# Patient Record
Sex: MALE | Race: WHITE | Employment: OTHER | URBAN - METROPOLITAN AREA
[De-identification: names, ages, dates, MRNs, and addresses within clinical notes are randomized per-mention and may not be internally consistent; named-entity substitution may affect disease eponyms.]

---

## 2020-02-18 ENCOUNTER — APPOINTMENT (OUTPATIENT)
Dept: CT IMAGING | Age: 45
End: 2020-02-18
Attending: EMERGENCY MEDICINE
Payer: COMMERCIAL

## 2020-02-18 ENCOUNTER — HOSPITAL ENCOUNTER (EMERGENCY)
Age: 45
Discharge: HOME | End: 2020-02-18
Attending: EMERGENCY MEDICINE
Payer: COMMERCIAL

## 2020-02-18 VITALS
RESPIRATION RATE: 18 BRPM | DIASTOLIC BLOOD PRESSURE: 95 MMHG | HEART RATE: 90 BPM | BODY MASS INDEX: 33.08 KG/M2 | HEIGHT: 68 IN | OXYGEN SATURATION: 94 % | TEMPERATURE: 97.7 F | SYSTOLIC BLOOD PRESSURE: 142 MMHG | WEIGHT: 218.26 LBS

## 2020-02-18 DIAGNOSIS — N20.0 BILATERAL RENAL STONES: Primary | ICD-10-CM

## 2020-02-18 LAB
ALBUMIN SERPL-MCNC: 4.2 G/DL (ref 3.5–5)
ALBUMIN/GLOB SERPL: 1.2 {RATIO} (ref 1.1–2.2)
ALP SERPL-CCNC: 77 U/L (ref 45–117)
ALT SERPL-CCNC: 36 U/L (ref 12–78)
ANION GAP SERPL CALC-SCNC: 18 MMOL/L (ref 5–15)
APPEARANCE UR: CLEAR
AST SERPL-CCNC: 14 U/L (ref 15–37)
BACTERIA URNS QL MICRO: NEGATIVE /HPF
BASOPHILS # BLD: 0 K/UL (ref 0–0.1)
BASOPHILS NFR BLD: 1 % (ref 0–1)
BILIRUB SERPL-MCNC: 0.4 MG/DL (ref 0.2–1)
BILIRUB UR QL: NEGATIVE
BUN SERPL-MCNC: 19 MG/DL (ref 6–20)
BUN/CREAT SERPL: 15 (ref 12–20)
CALCIUM SERPL-MCNC: 8.7 MG/DL (ref 8.5–10.1)
CHLORIDE SERPL-SCNC: 104 MMOL/L (ref 97–108)
CO2 SERPL-SCNC: 22 MMOL/L (ref 21–32)
COLOR UR: NORMAL
CREAT SERPL-MCNC: 1.27 MG/DL (ref 0.7–1.3)
DIFFERENTIAL METHOD BLD: NORMAL
EOSINOPHIL # BLD: 0.1 K/UL (ref 0–0.4)
EOSINOPHIL NFR BLD: 2 % (ref 0–7)
EPITH CASTS URNS QL MICRO: NORMAL /LPF
ERYTHROCYTE [DISTWIDTH] IN BLOOD BY AUTOMATED COUNT: 12.8 % (ref 11.5–14.5)
GLOBULIN SER CALC-MCNC: 3.4 G/DL (ref 2–4)
GLUCOSE SERPL-MCNC: 124 MG/DL (ref 65–100)
GLUCOSE UR STRIP.AUTO-MCNC: NEGATIVE MG/DL
HCT VFR BLD AUTO: 45.2 % (ref 36.6–50.3)
HGB BLD-MCNC: 15.4 G/DL (ref 12.1–17)
HGB UR QL STRIP: NEGATIVE
IMM GRANULOCYTES # BLD AUTO: 0 K/UL (ref 0–0.04)
IMM GRANULOCYTES NFR BLD AUTO: 0 % (ref 0–0.5)
KETONES UR QL STRIP.AUTO: NEGATIVE MG/DL
LEUKOCYTE ESTERASE UR QL STRIP.AUTO: NEGATIVE
LIPASE SERPL-CCNC: 248 U/L (ref 73–393)
LYMPHOCYTES # BLD: 2.6 K/UL (ref 0.8–3.5)
LYMPHOCYTES NFR BLD: 33 % (ref 12–49)
MCH RBC QN AUTO: 29.2 PG (ref 26–34)
MCHC RBC AUTO-ENTMCNC: 34.1 G/DL (ref 30–36.5)
MCV RBC AUTO: 85.6 FL (ref 80–99)
MONOCYTES # BLD: 0.6 K/UL (ref 0–1)
MONOCYTES NFR BLD: 8 % (ref 5–13)
NEUTS SEG # BLD: 4.5 K/UL (ref 1.8–8)
NEUTS SEG NFR BLD: 56 % (ref 32–75)
NITRITE UR QL STRIP.AUTO: NEGATIVE
NRBC # BLD: 0 K/UL (ref 0–0.01)
NRBC BLD-RTO: 0 PER 100 WBC
PH UR STRIP: 5.5 [PH] (ref 5–8)
PLATELET # BLD AUTO: 232 K/UL (ref 150–400)
PMV BLD AUTO: 11 FL (ref 8.9–12.9)
POTASSIUM SERPL-SCNC: 3.6 MMOL/L (ref 3.5–5.1)
PROT SERPL-MCNC: 7.6 G/DL (ref 6.4–8.2)
PROT UR STRIP-MCNC: NEGATIVE MG/DL
RBC # BLD AUTO: 5.28 M/UL (ref 4.1–5.7)
RBC #/AREA URNS HPF: NORMAL /HPF (ref 0–5)
SODIUM SERPL-SCNC: 144 MMOL/L (ref 136–145)
SP GR UR REFRACTOMETRY: 1.02 (ref 1–1.03)
UA: UC IF INDICATED,UAUC: NORMAL
UROBILINOGEN UR QL STRIP.AUTO: 0.2 EU/DL (ref 0.2–1)
WBC # BLD AUTO: 7.9 K/UL (ref 4.1–11.1)
WBC URNS QL MICRO: NORMAL /HPF (ref 0–4)

## 2020-02-18 PROCEDURE — 96374 THER/PROPH/DIAG INJ IV PUSH: CPT

## 2020-02-18 PROCEDURE — 74176 CT ABD & PELVIS W/O CONTRAST: CPT

## 2020-02-18 PROCEDURE — 74011250636 HC RX REV CODE- 250/636: Performed by: EMERGENCY MEDICINE

## 2020-02-18 PROCEDURE — 83690 ASSAY OF LIPASE: CPT

## 2020-02-18 PROCEDURE — 99282 EMERGENCY DEPT VISIT SF MDM: CPT

## 2020-02-18 PROCEDURE — 36415 COLL VENOUS BLD VENIPUNCTURE: CPT

## 2020-02-18 PROCEDURE — 96375 TX/PRO/DX INJ NEW DRUG ADDON: CPT

## 2020-02-18 PROCEDURE — 85025 COMPLETE CBC W/AUTO DIFF WBC: CPT

## 2020-02-18 PROCEDURE — 80053 COMPREHEN METABOLIC PANEL: CPT

## 2020-02-18 PROCEDURE — 81001 URINALYSIS AUTO W/SCOPE: CPT

## 2020-02-18 RX ORDER — KETOROLAC TROMETHAMINE 30 MG/ML
30 INJECTION, SOLUTION INTRAMUSCULAR; INTRAVENOUS
Status: COMPLETED | OUTPATIENT
Start: 2020-02-18 | End: 2020-02-18

## 2020-02-18 RX ORDER — ONDANSETRON 2 MG/ML
8 INJECTION INTRAMUSCULAR; INTRAVENOUS
Status: COMPLETED | OUTPATIENT
Start: 2020-02-18 | End: 2020-02-18

## 2020-02-18 RX ORDER — ONDANSETRON 8 MG/1
8 TABLET, ORALLY DISINTEGRATING ORAL
Qty: 15 TAB | Refills: 0 | Status: SHIPPED | OUTPATIENT
Start: 2020-02-18

## 2020-02-18 RX ORDER — NAPROXEN 500 MG/1
500 TABLET ORAL 2 TIMES DAILY WITH MEALS
Qty: 20 TAB | Refills: 0 | Status: SHIPPED | OUTPATIENT
Start: 2020-02-18 | End: 2020-02-28

## 2020-02-18 RX ADMIN — KETOROLAC TROMETHAMINE 30 MG: 30 INJECTION, SOLUTION INTRAMUSCULAR; INTRAVENOUS at 02:42

## 2020-02-18 RX ADMIN — ONDANSETRON 8 MG: 2 INJECTION, SOLUTION INTRAMUSCULAR; INTRAVENOUS at 02:42

## 2020-02-18 RX ADMIN — SODIUM CHLORIDE 1000 ML: 900 INJECTION, SOLUTION INTRAVENOUS at 02:46

## 2020-02-18 NOTE — ED PROVIDER NOTES
The patient is a 48-year-old male with a past medical history significant for bilateral stones who presents to the ED with a complaint of left flank pain, severity 10 out of 10 lasted approximately 1 hour, and has now subsided to about a 4 out of 10 at this time. The pain was accompanied by nausea, diaphoresis. The patient denies any fever, sore throat, cough, congestion, headache, neck and back pain, chest pain, shortness of breath, diarrhea, constipation, dysuria, extremity weakness or numbness, prior history of the same           No past medical history on file. No past surgical history on file. No family history on file.     Social History     Socioeconomic History    Marital status:      Spouse name: Not on file    Number of children: Not on file    Years of education: Not on file    Highest education level: Not on file   Occupational History    Not on file   Social Needs    Financial resource strain: Not on file    Food insecurity:     Worry: Not on file     Inability: Not on file    Transportation needs:     Medical: Not on file     Non-medical: Not on file   Tobacco Use    Smoking status: Not on file   Substance and Sexual Activity    Alcohol use: Not on file    Drug use: Not on file    Sexual activity: Not on file   Lifestyle    Physical activity:     Days per week: Not on file     Minutes per session: Not on file    Stress: Not on file   Relationships    Social connections:     Talks on phone: Not on file     Gets together: Not on file     Attends [employer] service: Not on file     Active member of club or organization: Not on file     Attends meetings of clubs or organizations: Not on file     Relationship status: Not on file    Intimate partner violence:     Fear of current or ex partner: Not on file     Emotionally abused: Not on file     Physically abused: Not on file     Forced sexual activity: Not on file   Other Topics Concern    Not on file   Social History Narrative    Not on file         ALLERGIES: Patient has no allergy information on record. Review of Systems   All other systems reviewed and are negative. Vitals:    02/18/20 0139   BP: (!) 142/95   Pulse: 90   Resp: 18   Temp: 97.7 °F (36.5 °C)   SpO2: 100%   Weight: 99 kg (218 lb 4.1 oz)   Height: 5' 8\" (1.727 m)            Physical Exam  Vitals signs and nursing note reviewed. Exam conducted with a chaperone present. CONSTITUTIONAL: Well-appearing; well-nourished; in no apparent distress  HEAD: Normocephalic; atraumatic  EYES: PERRL; EOM intact; conjunctiva and sclera are clear bilaterally. ENT: No rhinorrhea; normal pharynx with no tonsillar hypertrophy; mucous membranes pink/moist, no erythema, no exudate. NECK: Supple; non-tender; no cervical lymphadenopathy  CARD: Normal S1, S2; no murmurs, rubs, or gallops. Regular rate and rhythm. RESP: Normal respiratory effort; breath sounds clear and equal bilaterally; no wheezes, rhonchi, or rales. ABD: Normal bowel sounds; non-distended; non-tender; no palpable organomegaly, no masses, no bruits. Back Exam: Normal inspection; no vertebral point tenderness, no CVA tenderness. Normal range of motion. EXT: Normal ROM in all four extremities; non-tender to palpation; no swelling or deformity; distal pulses are normal, no edema. SKIN: Warm; dry; no rash. NEURO:Alert and oriented x 3, coherent, VLADISLAV-XII grossly intact, sensory and motor are non-focal.        MDM  Number of Diagnoses or Management Options  Bilateral renal stones:   Diagnosis management comments:       Assessment: Acute left flank pain in the patient with history of renal stone without obstructive uropathy, myofascial strain, obstipation, diverticular disease. Plan: Lab/ IVF/antiemetic and analgesia /CT scan of the abdomen and pelvis /serial exam onitor and Reevaluate.                            Amount and/or Complexity of Data Reviewed  Clinical lab tests: ordered and reviewed  Tests in the radiology section of CPT®: ordered and reviewed  Tests in the medicine section of CPT®: reviewed and ordered  Discussion of test results with the performing providers: yes  Decide to obtain previous medical records or to obtain history from someone other than the patient: yes  Obtain history from someone other than the patient: yes  Review and summarize past medical records: yes  Discuss the patient with other providers: yes  Independent visualization of images, tracings, or specimens: yes    Risk of Complications, Morbidity, and/or Mortality  Presenting problems: moderate  Diagnostic procedures: moderate  Management options: moderate    Patient Progress  Patient progress: stable         Procedures    Progress Note:   Pt has been reexamined by Mary Tyler MD. Pt is feeling much better. Symptoms have improved. All available results have been reviewed with pt and any available family. Pt understands sx, dx, and tx in ED. Care plan has been outlined and questions have been answered. Pt is ready to go home. Will send home on kidney stone instruction. Prescription of naproxen and Zofran. . Outpatient referral with PCP/urologist as needed. Written by Mary Tyler MD,3:42 AM    .   .

## 2020-02-18 NOTE — DISCHARGE INSTRUCTIONS
Kidney Stone: Care Instructions  Your Care Instructions    Kidney stones are formed when salts, minerals, and other substances normally found in the urine clump together. They can be as small as grains of sand or, rarely, as large as golf balls. While the stone is traveling through the ureter, which is the tube that carries urine from the kidney to the bladder, you will probably feel pain. The pain may be mild or very severe. You may also have some blood in your urine. As soon as the stone reaches the bladder, any intense pain should go away. If a stone is too large to pass on its own, you may need a medical procedure to help you pass the stone. The doctor has checked you carefully, but problems can develop later. If you notice any problems or new symptoms, get medical treatment right away. Follow-up care is a key part of your treatment and safety. Be sure to make and go to all appointments, and call your doctor if you are having problems. It's also a good idea to know your test results and keep a list of the medicines you take. How can you care for yourself at home? · Drink plenty of fluids, enough so that your urine is light yellow or clear like water. If you have kidney, heart, or liver disease and have to limit fluids, talk with your doctor before you increase the amount of fluids you drink. · Take pain medicines exactly as directed. Call your doctor if you think you are having a problem with your medicine. ? If the doctor gave you a prescription medicine for pain, take it as prescribed. ? If you are not taking a prescription pain medicine, ask your doctor if you can take an over-the-counter medicine. Read and follow all instructions on the label. · Your doctor may ask you to strain your urine so that you can collect your kidney stone when it passes. You can use a kitchen strainer or a tea strainer to catch the stone. Store it in a plastic bag until you see your doctor again.   Preventing future kidney stones  Some changes in your diet may help prevent kidney stones. Depending on the cause of your stones, your doctor may recommend that you:  · Drink plenty of fluids, enough so that your urine is light yellow or clear like water. If you have kidney, heart, or liver disease and have to limit fluids, talk with your doctor before you increase the amount of fluids you drink. · Limit coffee, tea, and alcohol. Also avoid grapefruit juice. · Do not take more than the recommended daily dose of vitamins C and D.  · Avoid antacids such as Gaviscon, Maalox, Mylanta, or Tums. · Limit the amount of salt (sodium) in your diet. · Eat a balanced diet that is not too high in protein. · Limit foods that are high in a substance called oxalate, which can cause kidney stones. These foods include dark green vegetables, rhubarb, chocolate, wheat bran, nuts, cranberries, and beans. When should you call for help? Call your doctor now or seek immediate medical care if:    · You cannot keep down fluids.     · Your pain gets worse.     · You have a fever or chills.     · You have new or worse pain in your back just below your rib cage (the flank area).     · You have new or more blood in your urine.    Watch closely for changes in your health, and be sure to contact your doctor if:    · You do not get better as expected. Where can you learn more? Go to http://jamal-meghan.info/. Enter N734 in the search box to learn more about \"Kidney Stone: Care Instructions. \"  Current as of: October 31, 2018  Content Version: 12.2  © 9884-4518 [employer]. Care instructions adapted under license by [employer] (which disclaims liability or warranty for this information). If you have questions about a medical condition or this instruction, always ask your healthcare professional. [address] any warranty or liability for your use of this information.     Patient Education kidney stones  Some changes in your diet may help prevent kidney stones. Depending on the cause of your stones, your doctor may recommend that you:  · Drink plenty of fluids, enough so that your urine is light yellow or clear like water. If you have kidney, heart, or liver disease and have to limit fluids, talk with your doctor before you increase the amount of fluids you drink. · Limit coffee, tea, and alcohol. Also avoid grapefruit juice. · Do not take more than the recommended daily dose of vitamins C and D.  · Avoid antacids such as Gaviscon, Maalox, Mylanta, or Tums. · Limit the amount of salt (sodium) in your diet. · Eat a balanced diet that is not too high in protein. · Limit foods that are high in a substance called oxalate, which can cause kidney stones. These foods include dark green vegetables, rhubarb, chocolate, wheat bran, nuts, cranberries, and beans. When should you call for help? Call your doctor now or seek immediate medical care if:    · You cannot keep down fluids.     · Your pain gets worse.     · You have a fever or chills.     · You have new or worse pain in your back just below your rib cage (the flank area).     · You have new or more blood in your urine.    Watch closely for changes in your health, and be sure to contact your doctor if:    · You do not get better as expected. Where can you learn more? Go to http://jamal-meghan.info/. Enter Z284 in the search box to learn more about \"Kidney Stone: Care Instructions. \"  Current as of: October 31, 2018  Content Version: 12.2  © 2364-4189 [employer] Care instructions adapted under license by [employer] (which disclaims liability or warranty for this information). If you have questions about a medical condition or this instruction, always ask your healthcare professional. [address] any warranty or liability for your use of this information.

## 2024-07-03 ENCOUNTER — HOSPITAL ENCOUNTER (EMERGENCY)
Facility: HOSPITAL | Age: 49
Discharge: HOME | End: 2024-07-03
Attending: EMERGENCY MEDICINE
Payer: COMMERCIAL

## 2024-07-03 ENCOUNTER — APPOINTMENT (OUTPATIENT)
Facility: HOSPITAL | Age: 49
End: 2024-07-03
Payer: COMMERCIAL

## 2024-07-03 VITALS
OXYGEN SATURATION: 97 % | RESPIRATION RATE: 14 BRPM | DIASTOLIC BLOOD PRESSURE: 76 MMHG | HEART RATE: 65 BPM | WEIGHT: 231.48 LBS | TEMPERATURE: 97.2 F | SYSTOLIC BLOOD PRESSURE: 120 MMHG

## 2024-07-03 DIAGNOSIS — N20.0 KIDNEY STONE: Primary | ICD-10-CM

## 2024-07-03 LAB
ALBUMIN SERPL-MCNC: 4.1 G/DL (ref 3.5–5)
ALBUMIN/GLOB SERPL: 1.1 (ref 1.1–2.2)
ALP SERPL-CCNC: 75 U/L (ref 45–117)
ALT SERPL-CCNC: 29 U/L (ref 12–78)
ANION GAP SERPL CALC-SCNC: 11 MMOL/L (ref 5–15)
APPEARANCE UR: CLEAR
AST SERPL-CCNC: 19 U/L (ref 15–37)
BACTERIA URNS QL MICRO: NEGATIVE /HPF
BASOPHILS # BLD: 0.1 K/UL (ref 0–0.1)
BASOPHILS NFR BLD: 1 % (ref 0–1)
BILIRUB SERPL-MCNC: 0.6 MG/DL (ref 0.2–1)
BILIRUB UR QL: NEGATIVE
BUN SERPL-MCNC: 13 MG/DL (ref 6–20)
BUN/CREAT SERPL: 9 (ref 12–20)
CALCIUM SERPL-MCNC: 9.3 MG/DL (ref 8.5–10.1)
CHLORIDE SERPL-SCNC: 104 MMOL/L (ref 97–108)
CO2 SERPL-SCNC: 26 MMOL/L (ref 21–32)
COLOR UR: ABNORMAL
CREAT SERPL-MCNC: 1.48 MG/DL (ref 0.7–1.3)
DIFFERENTIAL METHOD BLD: ABNORMAL
EOSINOPHIL # BLD: 0 K/UL (ref 0–0.4)
EOSINOPHIL NFR BLD: 0 % (ref 0–7)
EPITH CASTS URNS QL MICRO: ABNORMAL /LPF
ERYTHROCYTE [DISTWIDTH] IN BLOOD BY AUTOMATED COUNT: 12.9 % (ref 11.5–14.5)
GLOBULIN SER CALC-MCNC: 3.6 G/DL (ref 2–4)
GLUCOSE SERPL-MCNC: 121 MG/DL (ref 65–100)
GLUCOSE UR STRIP.AUTO-MCNC: NEGATIVE MG/DL
HCT VFR BLD AUTO: 45.9 % (ref 36.6–50.3)
HGB BLD-MCNC: 15.6 G/DL (ref 12.1–17)
HGB UR QL STRIP: ABNORMAL
IMM GRANULOCYTES # BLD AUTO: 0.1 K/UL (ref 0–0.04)
IMM GRANULOCYTES NFR BLD AUTO: 1 % (ref 0–0.5)
KETONES UR QL STRIP.AUTO: NEGATIVE MG/DL
LEUKOCYTE ESTERASE UR QL STRIP.AUTO: NEGATIVE
LIPASE SERPL-CCNC: 63 U/L (ref 13–75)
LYMPHOCYTES # BLD: 1.8 K/UL (ref 0.8–3.5)
LYMPHOCYTES NFR BLD: 17 % (ref 12–49)
MCH RBC QN AUTO: 28.9 PG (ref 26–34)
MCHC RBC AUTO-ENTMCNC: 34 G/DL (ref 30–36.5)
MCV RBC AUTO: 85.2 FL (ref 80–99)
MONOCYTES # BLD: 0.6 K/UL (ref 0–1)
MONOCYTES NFR BLD: 6 % (ref 5–13)
MUCOUS THREADS URNS QL MICRO: ABNORMAL /LPF
NEUTS SEG # BLD: 8.1 K/UL (ref 1.8–8)
NEUTS SEG NFR BLD: 75 % (ref 32–75)
NITRITE UR QL STRIP.AUTO: NEGATIVE
NRBC # BLD: 0 K/UL (ref 0–0.01)
NRBC BLD-RTO: 0 PER 100 WBC
PH UR STRIP: 6 (ref 5–8)
PLATELET # BLD AUTO: 217 K/UL (ref 150–400)
PMV BLD AUTO: 10.7 FL (ref 8.9–12.9)
POTASSIUM SERPL-SCNC: 4.6 MMOL/L (ref 3.5–5.1)
PROT SERPL-MCNC: 7.7 G/DL (ref 6.4–8.2)
PROT UR STRIP-MCNC: NEGATIVE MG/DL
RBC # BLD AUTO: 5.39 M/UL (ref 4.1–5.7)
RBC #/AREA URNS HPF: ABNORMAL /HPF (ref 0–5)
SODIUM SERPL-SCNC: 141 MMOL/L (ref 136–145)
SP GR UR REFRACTOMETRY: 1.01 (ref 1–1.03)
UROBILINOGEN UR QL STRIP.AUTO: 0.2 EU/DL (ref 0.2–1)
WBC # BLD AUTO: 10.6 K/UL (ref 4.1–11.1)
WBC URNS QL MICRO: ABNORMAL /HPF (ref 0–4)

## 2024-07-03 PROCEDURE — 99284 EMERGENCY DEPT VISIT MOD MDM: CPT

## 2024-07-03 PROCEDURE — 80053 COMPREHEN METABOLIC PANEL: CPT

## 2024-07-03 PROCEDURE — 96374 THER/PROPH/DIAG INJ IV PUSH: CPT

## 2024-07-03 PROCEDURE — 2580000003 HC RX 258: Performed by: PHYSICIAN ASSISTANT

## 2024-07-03 PROCEDURE — 36415 COLL VENOUS BLD VENIPUNCTURE: CPT

## 2024-07-03 PROCEDURE — 6360000002 HC RX W HCPCS: Performed by: PHYSICIAN ASSISTANT

## 2024-07-03 PROCEDURE — 74176 CT ABD & PELVIS W/O CONTRAST: CPT

## 2024-07-03 PROCEDURE — 96375 TX/PRO/DX INJ NEW DRUG ADDON: CPT

## 2024-07-03 PROCEDURE — 81001 URINALYSIS AUTO W/SCOPE: CPT

## 2024-07-03 PROCEDURE — 83690 ASSAY OF LIPASE: CPT

## 2024-07-03 PROCEDURE — 85025 COMPLETE CBC W/AUTO DIFF WBC: CPT

## 2024-07-03 PROCEDURE — 6370000000 HC RX 637 (ALT 250 FOR IP): Performed by: PHYSICIAN ASSISTANT

## 2024-07-03 RX ORDER — ONDANSETRON 4 MG/1
4 TABLET, ORALLY DISINTEGRATING ORAL EVERY 8 HOURS PRN
Qty: 20 TABLET | Refills: 0 | Status: SHIPPED | OUTPATIENT
Start: 2024-07-03 | End: 2024-07-10

## 2024-07-03 RX ORDER — TAMSULOSIN HYDROCHLORIDE 0.4 MG/1
0.4 CAPSULE ORAL DAILY
Status: DISCONTINUED | OUTPATIENT
Start: 2024-07-03 | End: 2024-07-03 | Stop reason: HOSPADM

## 2024-07-03 RX ORDER — KETOROLAC TROMETHAMINE 10 MG/1
10 TABLET, FILM COATED ORAL EVERY 6 HOURS PRN
Qty: 20 TABLET | Refills: 0 | Status: SHIPPED | OUTPATIENT
Start: 2024-07-03 | End: 2024-07-08

## 2024-07-03 RX ORDER — ONDANSETRON 2 MG/ML
4 INJECTION INTRAMUSCULAR; INTRAVENOUS
Status: COMPLETED | OUTPATIENT
Start: 2024-07-03 | End: 2024-07-03

## 2024-07-03 RX ORDER — 0.9 % SODIUM CHLORIDE 0.9 %
1000 INTRAVENOUS SOLUTION INTRAVENOUS ONCE
Status: COMPLETED | OUTPATIENT
Start: 2024-07-03 | End: 2024-07-03

## 2024-07-03 RX ORDER — KETOROLAC TROMETHAMINE 30 MG/ML
30 INJECTION, SOLUTION INTRAMUSCULAR; INTRAVENOUS ONCE
Status: COMPLETED | OUTPATIENT
Start: 2024-07-03 | End: 2024-07-03

## 2024-07-03 RX ORDER — TRAMADOL HYDROCHLORIDE 50 MG/1
50 TABLET ORAL EVERY 4 HOURS PRN
Qty: 12 TABLET | Refills: 0 | Status: SHIPPED | OUTPATIENT
Start: 2024-07-03 | End: 2024-07-06

## 2024-07-03 RX ORDER — TAMSULOSIN HYDROCHLORIDE 0.4 MG/1
0.4 CAPSULE ORAL DAILY
Qty: 10 CAPSULE | Refills: 0 | Status: SHIPPED | OUTPATIENT
Start: 2024-07-03 | End: 2024-07-13

## 2024-07-03 RX ORDER — MORPHINE SULFATE 4 MG/ML
4 INJECTION, SOLUTION INTRAMUSCULAR; INTRAVENOUS
Status: DISCONTINUED | OUTPATIENT
Start: 2024-07-03 | End: 2024-07-03 | Stop reason: HOSPADM

## 2024-07-03 RX ADMIN — KETOROLAC TROMETHAMINE 30 MG: 30 INJECTION, SOLUTION INTRAMUSCULAR at 13:11

## 2024-07-03 RX ADMIN — SODIUM CHLORIDE 1000 ML: 9 INJECTION, SOLUTION INTRAVENOUS at 13:23

## 2024-07-03 RX ADMIN — ONDANSETRON 4 MG: 2 INJECTION INTRAMUSCULAR; INTRAVENOUS at 13:11

## 2024-07-03 RX ADMIN — TAMSULOSIN HYDROCHLORIDE 0.4 MG: 0.4 CAPSULE ORAL at 14:18

## 2024-07-03 ASSESSMENT — PAIN SCALES - GENERAL
PAINLEVEL_OUTOF10: 10
PAINLEVEL_OUTOF10: 2
PAINLEVEL_OUTOF10: 10
PAINLEVEL_OUTOF10: 2

## 2024-07-03 ASSESSMENT — PAIN DESCRIPTION - LOCATION
LOCATION: FLANK

## 2024-07-03 ASSESSMENT — PAIN DESCRIPTION - DESCRIPTORS: DESCRIPTORS: STABBING

## 2024-07-03 ASSESSMENT — PAIN - FUNCTIONAL ASSESSMENT: PAIN_FUNCTIONAL_ASSESSMENT: 0-10

## 2024-07-03 ASSESSMENT — PAIN DESCRIPTION - ORIENTATION
ORIENTATION: RIGHT
ORIENTATION: RIGHT

## 2024-07-03 NOTE — ED PROVIDER NOTES
EMERGENCY DEPARTMENT PHYSICIAN NOTE     Patient: Jude Browning     Time of Service: 7/3/2024 12:35 PM     Chief complaint:   Chief Complaint   Patient presents with    Flank Pain        HISTORY:  Patient is a 48 y.o. male who presents to the emergency department with complaints of right flank pain.  Patient states he began with some mild discomfort on Monday that improved after NSAIDs and then pain began again around 9 AM today and has been a 10 out of 10.  Patient with a history of kidney stones and states this feels very similar.  Some associated nausea secondary to pain.  No vomiting.  Denies urinary symptoms or testicular swelling.  Followed by [facility] in the past.  Denies fever, chills, chest pain, shortness of breath.      Past Medical History:   Diagnosis Date    Kidney stones         History reviewed. No pertinent surgical history.     History reviewed. No pertinent family history.     Social History     Socioeconomic History    Marital status:      Spouse name: None    Number of children: None    Years of education: None    Highest education level: None   Tobacco Use    Smoking status: Never    Smokeless tobacco: Never        Current Medications: Reviewed in chart.    Allergies: No Known Allergies       REVIEW OF SYSTEMS: See HPI for pertinent positives and negatives.      PHYSICAL EXAM:  /80   Pulse 65   Temp 97.8 °F (36.6 °C) (Oral)   Resp 14   Wt 105 kg (231 lb 7.7 oz)   SpO2 98%    Physical Exam  Vitals and nursing note reviewed.   Constitutional:       General: He is in acute distress.   HENT:      Head: Normocephalic and atraumatic.      Right Ear: External ear normal.      Left Ear: External ear normal.      Nose: Nose normal.      Mouth/Throat:      Pharynx: No oropharyngeal exudate or posterior oropharyngeal erythema.   Eyes:      General:         Right eye: No discharge.         Left eye: No discharge.      Conjunctiva/sclera: Conjunctivae normal.      Pupils:

## 2024-07-03 NOTE — ED TRIAGE NOTES
48 year old male pt comes to the ED via POV for a CC Right flank pain. Pt states it started Monday and went away but came back this morning today. Pt has a hx of 9 kidney stones in his right kidney. Pt is A&Ox4.    2 hours ago he took some oral toradol but it hasn't helped.

## 2024-07-03 NOTE — DISCHARGE INSTRUCTIONS
I encourage you to use the Toradol and Tylenol as needed for pain along with taking the Flomax.  If the pain is not under control I have sent a prescription for tramadol.  Please do not mix that with alcohol or drive after taking this medication as it is sedating.    Call [facility] for urology follow-up as discussed.